# Patient Record
Sex: MALE | Race: WHITE
[De-identification: names, ages, dates, MRNs, and addresses within clinical notes are randomized per-mention and may not be internally consistent; named-entity substitution may affect disease eponyms.]

---

## 2018-08-23 LAB
ANION GAP SERPL CALC-SCNC: 12.2 MMOL/L (ref 8–16)
APTT BLD: 25.5 SECONDS (ref 22.8–39.4)
BASOPHILS NFR BLD AUTO: 0.5 % (ref 0–2)
BUN SERPL-MCNC: 42 MG/DL (ref 7–18)
CALCIUM SERPL-MCNC: 8.9 MG/DL (ref 8.5–10.1)
CHLORIDE SERPL-SCNC: 102 MMOL/L (ref 98–107)
CO2 SERPL-SCNC: 29.1 MMOL/L (ref 21–32)
CREAT SERPL-MCNC: 4.1 MG/DL (ref 0.6–1.3)
EOSINOPHIL NFR BLD: 1.6 % (ref 0–7)
ERYTHROCYTE [DISTWIDTH] IN BLOOD BY AUTOMATED COUNT: 16 % (ref 11.5–14.5)
GLUCOSE SERPL-MCNC: 237 MG/DL (ref 74–106)
HCT VFR BLD CALC: 39.1 % (ref 42–54)
HGB BLD-MCNC: 12.3 G/DL (ref 13.5–17.5)
IMM GRANULOCYTES NFR BLD: 0.1 % (ref 0–5)
INR PPP: 1.07 (ref 0.85–1.17)
LYMPHOCYTES NFR BLD AUTO: 13.4 % (ref 15–50)
MCH RBC QN AUTO: 25.7 PG (ref 26–34)
MCHC RBC AUTO-ENTMCNC: 31.5 G/DL (ref 31–37)
MCV RBC: 81.8 FL (ref 80–100)
MONOCYTES NFR BLD: 5.2 % (ref 2–11)
NEUTROPHILS NFR BLD AUTO: 79.2 % (ref 40–80)
OSMOLALITY SERPL CALC.SUM OF ELEC: 296 MOSM/KG (ref 275–300)
PLATELET # BLD: 283 10X3/UL (ref 130–400)
PMV BLD AUTO: 9.8 FL (ref 7.4–10.4)
POTASSIUM SERPL-SCNC: 4.3 MMOL/L (ref 3.5–5.1)
PROTHROMBIN TIME: 13.5 SECONDS (ref 11.6–15)
RBC # BLD AUTO: 4.78 10X6/UL (ref 4.2–6.1)
SODIUM SERPL-SCNC: 139 MMOL/L (ref 136–145)
WBC # BLD AUTO: 7.5 10X3/UL (ref 4.8–10.8)

## 2018-08-24 ENCOUNTER — HOSPITAL ENCOUNTER (OUTPATIENT)
Dept: HOSPITAL 84 - D.OPS | Age: 58
Discharge: HOME | End: 2018-08-24
Attending: INTERNAL MEDICINE
Payer: MEDICARE

## 2018-08-24 VITALS
HEIGHT: 72 IN | HEIGHT: 72 IN | BODY MASS INDEX: 23.7 KG/M2 | BODY MASS INDEX: 23.7 KG/M2 | WEIGHT: 175 LBS | WEIGHT: 175 LBS

## 2018-08-24 DIAGNOSIS — Z01.812: ICD-10-CM

## 2018-08-24 DIAGNOSIS — Z99.2: ICD-10-CM

## 2018-08-24 DIAGNOSIS — N18.6: ICD-10-CM

## 2018-08-24 DIAGNOSIS — T82.590A: Primary | ICD-10-CM

## 2018-09-28 ENCOUNTER — HOSPITAL ENCOUNTER (OUTPATIENT)
Dept: HOSPITAL 84 - D.OPS | Age: 58
Discharge: HOME | End: 2018-09-28
Attending: INTERNAL MEDICINE
Payer: MEDICARE

## 2018-09-28 VITALS — WEIGHT: 170 LBS | BODY MASS INDEX: 23.03 KG/M2 | HEIGHT: 72 IN | BODY MASS INDEX: 23.03 KG/M2

## 2018-09-28 DIAGNOSIS — Z01.812: ICD-10-CM

## 2018-09-28 DIAGNOSIS — N18.6: Primary | ICD-10-CM

## 2018-09-28 DIAGNOSIS — Z53.8: ICD-10-CM

## 2018-09-28 LAB
ANION GAP SERPL CALC-SCNC: 14.5 MMOL/L (ref 8–16)
APTT BLD: 26.6 SECONDS (ref 22.8–39.4)
BASOPHILS NFR BLD AUTO: 0.5 % (ref 0–2)
BUN SERPL-MCNC: 93 MG/DL (ref 7–18)
CALCIUM SERPL-MCNC: 8.6 MG/DL (ref 8.5–10.1)
CHLORIDE SERPL-SCNC: 104 MMOL/L (ref 98–107)
CO2 SERPL-SCNC: 27 MMOL/L (ref 21–32)
CREAT SERPL-MCNC: 7.2 MG/DL (ref 0.6–1.3)
EOSINOPHIL NFR BLD: 4.1 % (ref 0–7)
ERYTHROCYTE [DISTWIDTH] IN BLOOD BY AUTOMATED COUNT: 15.5 % (ref 11.5–14.5)
GLUCOSE SERPL-MCNC: 39 MG/DL (ref 74–106)
HCT VFR BLD CALC: 34 % (ref 42–54)
HGB BLD-MCNC: 11.2 G/DL (ref 13.5–17.5)
IMM GRANULOCYTES NFR BLD: 0.1 % (ref 0–5)
INR PPP: 1.04 (ref 0.85–1.17)
LYMPHOCYTES NFR BLD AUTO: 29.4 % (ref 15–50)
MCH RBC QN AUTO: 26.1 PG (ref 26–34)
MCHC RBC AUTO-ENTMCNC: 32.9 G/DL (ref 31–37)
MCV RBC: 79.3 FL (ref 80–100)
MONOCYTES NFR BLD: 6.2 % (ref 2–11)
NEUTROPHILS NFR BLD AUTO: 59.7 % (ref 40–80)
OSMOLALITY SERPL CALC.SUM OF ELEC: 308 MOSM/KG (ref 275–300)
PLATELET # BLD: 261 10X3/UL (ref 130–400)
PMV BLD AUTO: 10.2 FL (ref 7.4–10.4)
POTASSIUM SERPL-SCNC: 3.5 MMOL/L (ref 3.5–5.1)
PROTHROMBIN TIME: 13.2 SECONDS (ref 11.6–15)
RBC # BLD AUTO: 4.29 10X6/UL (ref 4.2–6.1)
SODIUM SERPL-SCNC: 142 MMOL/L (ref 136–145)
WBC # BLD AUTO: 7.8 10X3/UL (ref 4.8–10.8)

## 2019-01-25 ENCOUNTER — HOSPITAL ENCOUNTER (OUTPATIENT)
Dept: HOSPITAL 84 - D.OPS | Age: 59
Discharge: HOME | End: 2019-01-25
Attending: INTERNAL MEDICINE
Payer: MEDICARE

## 2019-01-25 VITALS — HEIGHT: 72 IN | BODY MASS INDEX: 23.07 KG/M2 | WEIGHT: 170.36 LBS

## 2019-01-25 VITALS — DIASTOLIC BLOOD PRESSURE: 109 MMHG | SYSTOLIC BLOOD PRESSURE: 167 MMHG

## 2019-01-25 DIAGNOSIS — N18.6: ICD-10-CM

## 2019-01-25 DIAGNOSIS — Z99.2: ICD-10-CM

## 2019-01-25 DIAGNOSIS — T82.590A: Primary | ICD-10-CM

## 2019-01-25 DIAGNOSIS — Z01.812: ICD-10-CM

## 2019-01-25 LAB
ANION GAP SERPL CALC-SCNC: 19.2 MMOL/L (ref 8–16)
APTT BLD: 35 SECONDS (ref 22.8–39.4)
BASOPHILS NFR BLD AUTO: 0.5 % (ref 0–2)
BUN SERPL-MCNC: 81 MG/DL (ref 7–18)
CALCIUM SERPL-MCNC: 7.8 MG/DL (ref 8.5–10.1)
CHLORIDE SERPL-SCNC: 104 MMOL/L (ref 98–107)
CO2 SERPL-SCNC: 23.6 MMOL/L (ref 21–32)
CREAT SERPL-MCNC: 10.1 MG/DL (ref 0.6–1.3)
EOSINOPHIL NFR BLD: 3.7 % (ref 0–7)
ERYTHROCYTE [DISTWIDTH] IN BLOOD BY AUTOMATED COUNT: 17.2 % (ref 11.5–14.5)
GLUCOSE SERPL-MCNC: 169 MG/DL (ref 74–106)
HCT VFR BLD CALC: 31.5 % (ref 42–54)
HGB BLD-MCNC: 10.2 G/DL (ref 13.5–17.5)
IMM GRANULOCYTES NFR BLD: 0.1 % (ref 0–5)
INR PPP: 1.14 (ref 0.85–1.17)
LYMPHOCYTES NFR BLD AUTO: 8.5 % (ref 15–50)
MCH RBC QN AUTO: 29.4 PG (ref 26–34)
MCHC RBC AUTO-ENTMCNC: 32.4 G/DL (ref 31–37)
MCV RBC: 90.8 FL (ref 80–100)
MONOCYTES NFR BLD: 7.6 % (ref 2–11)
NEUTROPHILS NFR BLD AUTO: 79.6 % (ref 40–80)
OSMOLALITY SERPL CALC.SUM OF ELEC: 312 MOSM/KG (ref 275–300)
PLATELET # BLD: 276 10X3/UL (ref 130–400)
PMV BLD AUTO: 9.5 FL (ref 7.4–10.4)
POTASSIUM SERPL-SCNC: 3.8 MMOL/L (ref 3.5–5.1)
PROTHROMBIN TIME: 14.1 SECONDS (ref 11.6–15)
RBC # BLD AUTO: 3.47 10X6/UL (ref 4.2–6.1)
SODIUM SERPL-SCNC: 143 MMOL/L (ref 136–145)
WBC # BLD AUTO: 10.2 10X3/UL (ref 4.8–10.8)

## 2019-01-25 NOTE — NUR
RIGHT HAND PIV DC'D WITH TIP INTACT.  PATIENT AMBULATES AROUND ROOM,
SITS IN CHAIR AND EATS FULL LIQUID RENAL ADA TRAY.  DISCHARGE
INSTRUCTIONS REVIEWED WITH PATIENT AND SPOUSE

## 2019-01-25 NOTE — OP
PATIENT NAME:  PATIENCE PAREDES                             MEDICAL RECORD: [id number]
:62                                             LOCATION:D.OPS          
                                                         ADMISSION DATE:        
SURGEON:  OLU BUCK MD            
 
 
DATE OF OPERATION:  2019
 
PREOPERATIVE DIAGNOSES:  End-stage renal disease and dependence on peritoneal
dialysis with failure to mature a left brachiobasilic AV fistula.
 
SURGEON:  Olu Buck MD
 
ANESTHESIA:  General with LMA per CRNA.
 
OPERATION PERFORMED:  Ultrasound-guided access and fistulogram followed by open
ligation of fistula and then creation of a translocated basilic vein, brachial
artery AV fistula.
 
PREOPERATIVE NOTE:  Mr. Paredes is a 58-year-old white male patient from [place], [place].  He has end-stage renal disease and he is on chronic home ambulatory
peritoneal dialysis.  He had an AV fistula, a brachiobasilic AV fistula, in the
antecubital space created last year and it really has failed to mature.  He is
returned to the operating room.  Under general anesthesia with LMA per CRNA, the
patient was prepped and draped in a sterile manner.  The fistula, which was
between the median cubital vein and the distal brachial artery was accessed with
ultrasound-guided micropuncture technique and a fistulogram performed.  This
revealed no flow into the cephalic vein above the antecubital space.  There was
also no flow into the basilic vein at least not directly and there was
retrograde flow into the forearm with filling of collaterals.  These eventually
fill the basilic vein as the only runoff for this fistula.  I then made a long
incision from axilla to antecubital space and hockey stick shaped to the
incision, so that it crossed from medial to lateral across the antecubital space
and I exposed the previous fistula and this was ligated proximally and distally
with 2-0 silk which stopped fistula flow.  There was a residual normal high
resistance pulsatile flow in the brachial artery above and below this area of
ligated fistula.  There was very little Doppler flow signal present in the
radial and ulnar arteries at the wrist.  At that point, the patient was cold and
had been receiving pressors to maintain his arterial blood pressure during the
operation under anesthesia.  I exposed the brachial artery at the level above
the antecubital space and fully mobilized the basilic vein to the axilla. 
Branches were divided between Vicryl ties and Hemoclips.  The vein was ligated
just proximal to the old arterial anastomosis and then flushed with heparinized
saline and treated with topical papaverine.  The vein was pulled through a very
superficial subcutaneous tunnel and brought back to the brachial artery, which
was then controlled and occluded with Silastic loops.  An arteriogram was
performed by inserting a micropuncture needle and catheter into the brachial
artery directed distally.  This arteriogram revealed normal flow and contour of
the distal brachial artery with the exception of the old fistula site.  There
was still a trace of flow of there into some smaller collateral veins.  These
were very small and there was good flow through 3 vessels into the forearm. 
There was no sign of embolus or atherosclerotic occlusive change.  An
arteriotomy was then made and the artery flushed proximally and distally with
heparinized saline and an end-to-side, end of vein to side of artery anastomosis
was performed with running 7-0 Prolene when completed and the occluding loops
were released, excellent flow developed immediately within the fistula and there
was persistent high resistance normal arterial flow signals from the distal
brachial artery.  The wound was irrigated with Ancef/gentamicin solution and
 
 
 
OPERATIVE REPORT                               [id number]    PATIENCE PAREDES           
 
 
infiltrated with 0.25% Marcaine without epinephrine.  Wound closure was
performed with interrupted inverted 3-0 Vicryl without the use of a drain and
running intracuticular 4-0 Monocryl.  The skin was sealed with Dermabond glue
and dressed with Maxorb Ag, Tegaderm, and Cavilon skin prep.  The patient was
awakened and taken to the recovery room in stable condition.  Both hands were
cool.  There is easily visible and palpable fistula in the left arm.  There is
adequate capillary refill in both hands and I do not think this is going to be a
steal syndrome.
 
If the patient continues to be comfortable and stable, he will be allowed to go
home to [place] this evening.  It is now about 05:30 or 05:45 as I dictate. 
Assuming he is comfortable and stable, he will be able to go home.  He and his
wife are given my personal cell phone number so they can reach me if needed over
the weekend or after hours in general.  He is given a prescription for Dilaudid
1 mg tablets 10 of them, he can take 1 q.4 hours p.r.n. pain and is advised to
elevate his left arm to prevent or reduce swelling which to a degree is expected
in this instance.  He can leave the original operative dressing intact until he
comes to see me in my office next week or the week after.  He will continue all
of his same medications and his same peritoneal dialysis schedule and renal
diet, etc.  All sponges, instruments and needles were accounted for.  No drain
was used.  Blood loss was about 25 cc and unreplaced and no surgical specimen
was submitted for histopathology.
 
TRANSINT:[id number] Voice Confirmation ID: [id number] DOCUMENT ID: [id number]
                                           
                                           OLU BUCK MD            
 
 
 
 
 
 
 
 
 
 
 
 
 
 
 
 
 
 
 
 
 
CC: AMARIS BROWN                                            [id number]
DICTATION DATE: 19     :     19      [facility] [place] 
                                                                      19
[facility]                                          
[address] [place] [address]